# Patient Record
Sex: FEMALE | Race: OTHER | URBAN - METROPOLITAN AREA
[De-identification: names, ages, dates, MRNs, and addresses within clinical notes are randomized per-mention and may not be internally consistent; named-entity substitution may affect disease eponyms.]

---

## 2017-11-14 ENCOUNTER — IMPORTED ENCOUNTER (OUTPATIENT)
Dept: URBAN - METROPOLITAN AREA CLINIC 9 | Facility: CLINIC | Age: 72
End: 2017-11-14

## 2018-10-01 ENCOUNTER — IMPORTED ENCOUNTER (OUTPATIENT)
Dept: URBAN - METROPOLITAN AREA CLINIC 9 | Facility: CLINIC | Age: 73
End: 2018-10-01

## 2019-12-13 NOTE — PATIENT DISCUSSION
GLAUCOMA SUSPECT BOTH EYES:  Pt has glaucomatous cups ou with normal visual fields and IOP of OD 19, OS  20. Will monitor.

## 2020-01-07 ENCOUNTER — IMPORTED ENCOUNTER (OUTPATIENT)
Dept: URBAN - METROPOLITAN AREA CLINIC 9 | Facility: CLINIC | Age: 75
End: 2020-01-07

## 2021-01-25 ENCOUNTER — IMPORTED ENCOUNTER (OUTPATIENT)
Dept: URBAN - METROPOLITAN AREA CLINIC 9 | Facility: CLINIC | Age: 76
End: 2021-01-25

## 2021-01-25 PROBLEM — Z79.899: Noted: 2021-01-25

## 2021-01-25 PROBLEM — H26.493: Noted: 2021-01-25

## 2021-01-25 PROBLEM — H01.02B: Noted: 2021-01-25

## 2021-01-25 PROBLEM — H01.02A: Noted: 2021-01-25

## 2021-01-25 PROBLEM — H43.811: Noted: 2021-01-25

## 2021-01-25 PROBLEM — H40.013: Noted: 2021-01-25

## 2021-06-03 NOTE — PATIENT DISCUSSION
CATARACT, OU - VISUALLY SIGNIFICANT. SCHEDULE PHACO WITH IOL OD FIRST THEN OS IF VISUAL SYMPTOMS PERSIST. GLASSES RX GIVEN TO FILL IF DESIRES IN THE EVENT PATIENT DOES NOT PROCEED WITH SURGERY. DO NOT RECOMMEND AVT, CUSTOM OR ADVANCED IOL.

## 2021-06-03 NOTE — PATIENT DISCUSSION
GLAUCOMA (S), OU:  OPTIC NERVE THINNING WITH CUPPING OU. IOP IS SLIGHTLY ELEVATED. RECOMMEND HVF PRIOR TO PROCEEDING WITH CAT SX. RETURN FOR FOLLOW UP AS SCHEDULED.

## 2021-08-23 NOTE — PATIENT DISCUSSION
GLAUCOMA SUSPECT OU: HVF TODAY WNL OU. NO TREATMENT INDICATED AT THIS TIME. FOLLOW-UP AS DIRECTED. MONITOR.

## 2021-10-16 ASSESSMENT — VISUAL ACUITY
OD_CC: 20/30 SN
OD_SC: 20/30 - SN
OD_SC: 20/30 -2 SN
OS_SC: 20/30 - SN
OD_SC: 20/30 + SN
OS_SC: 20/50 SN
OS_SC: 20/40 -2 SN
OS_CC: 20/20 SN
OS_SC: 20/30 - SN
OD_SC: 20/40 - SN

## 2021-10-16 ASSESSMENT — TONOMETRY
OD_IOP_MMHG: 16
OD_IOP_MMHG: 19
OS_IOP_MMHG: 14
OS_IOP_MMHG: 14
OS_IOP_MMHG: 19
OS_IOP_MMHG: 19
OD_IOP_MMHG: 18
OD_IOP_MMHG: 16

## 2021-10-16 ASSESSMENT — PACHYMETRY
OD_CT_UM: 520.0
OS_CT_UM: 538.0

## 2022-03-07 NOTE — PATIENT DISCUSSION
Recommend patient return to Dr. Augusto Lennox about 2 months post operatively from cataract surgery.

## 2022-03-07 NOTE — PATIENT DISCUSSION
Explained to patient different IOLs available. Do not recommend MF IOL. Spoke with patient about basic vs premium IOL.

## 2022-06-08 NOTE — PATIENT DISCUSSION
Post-Op Instructions: The patient was instructed in the proper use of post-operative eye drops: combination eye drop one drop 3 times per day and continue as directed. Call back instructions, retinal detachment and endophthalmitis precautions given.

## 2022-06-21 NOTE — PATIENT DISCUSSION
Patient discussed wanting to set up referral to Dr. Isaac Mcclain for drooping eyelid OD that Dr. Aurelio Ray had discussed with her. Ok to refer to Dr. Isaac Mcclain or Dr. Benigno Mcconnell. Gave patient samples of Upneeq to try - if she likes we can send Rx to pharmacy.

## 2022-08-04 NOTE — PATIENT DISCUSSION
Educated patient on findings and conditions. Prescribe Tobradex librado qhs OU x 2 weeks, then discontinue. Begin warm compresses QD-BID OU and eyelid scrubs QD-BID OU. Restart artificial tears BID/prn OU. Advised to call/RTC if si/sx persist or worsen. Monitor.

## 2022-10-18 NOTE — PATIENT DISCUSSION
Healing well. Instructed patient to gently massage lid, right lid only slightly higher but should even out over time.

## 2023-01-10 ENCOUNTER — ESTABLISHED PATIENT (OUTPATIENT)
Dept: URBAN - METROPOLITAN AREA CLINIC 17 | Facility: CLINIC | Age: 78
End: 2023-01-10

## 2023-01-10 DIAGNOSIS — H01.02B: ICD-10-CM

## 2023-01-10 DIAGNOSIS — H26.493: ICD-10-CM

## 2023-01-10 DIAGNOSIS — H01.02A: ICD-10-CM

## 2023-01-10 DIAGNOSIS — H40.013: ICD-10-CM

## 2023-01-10 DIAGNOSIS — Z79.899: ICD-10-CM

## 2023-01-10 PROCEDURE — 99214 OFFICE O/P EST MOD 30 MIN: CPT

## 2023-01-10 PROCEDURE — 92134 CPTRZ OPH DX IMG PST SGM RTA: CPT

## 2023-01-10 ASSESSMENT — VISUAL ACUITY
OS_SC: 20/60
OD_SC: 20/40

## 2023-01-10 ASSESSMENT — TONOMETRY
OD_IOP_MMHG: 20
OS_IOP_MMHG: 21

## 2024-02-21 ENCOUNTER — ESTABLISHED PATIENT (OUTPATIENT)
Dept: URBAN - METROPOLITAN AREA CLINIC 17 | Facility: CLINIC | Age: 79
End: 2024-02-21

## 2024-02-21 DIAGNOSIS — H01.02B: ICD-10-CM

## 2024-02-21 DIAGNOSIS — H40.013: ICD-10-CM

## 2024-02-21 DIAGNOSIS — H01.02A: ICD-10-CM

## 2024-02-21 DIAGNOSIS — H26.493: ICD-10-CM

## 2024-02-21 DIAGNOSIS — Z79.899: ICD-10-CM

## 2024-02-21 PROCEDURE — 99214 OFFICE O/P EST MOD 30 MIN: CPT

## 2024-02-21 ASSESSMENT — VISUAL ACUITY
OD_SC: 20/70
OS_SC: 20/50
OS_PH: 20/30
OD_PH: 20/25

## 2024-02-21 ASSESSMENT — TONOMETRY
OS_IOP_MMHG: 14
OD_IOP_MMHG: 14

## 2025-08-25 ENCOUNTER — COMPREHENSIVE EXAM (OUTPATIENT)
Age: 80
End: 2025-08-25

## 2025-08-25 DIAGNOSIS — Z79.899: ICD-10-CM

## 2025-08-25 DIAGNOSIS — H01.02A: ICD-10-CM

## 2025-08-25 DIAGNOSIS — H26.493: ICD-10-CM

## 2025-08-25 DIAGNOSIS — H01.02B: ICD-10-CM

## 2025-08-25 DIAGNOSIS — H40.013: ICD-10-CM

## 2025-08-25 PROCEDURE — 92133 CPTRZD OPH DX IMG PST SGM ON: CPT

## 2025-08-25 PROCEDURE — 92015 DETERMINE REFRACTIVE STATE: CPT

## 2025-08-25 PROCEDURE — 92134 CPTRZ OPH DX IMG PST SGM RTA: CPT | Mod: NC

## 2025-08-25 PROCEDURE — 92014 COMPRE OPH EXAM EST PT 1/>: CPT
